# Patient Record
Sex: MALE | Race: BLACK OR AFRICAN AMERICAN | NOT HISPANIC OR LATINO | Employment: OTHER | URBAN - METROPOLITAN AREA
[De-identification: names, ages, dates, MRNs, and addresses within clinical notes are randomized per-mention and may not be internally consistent; named-entity substitution may affect disease eponyms.]

---

## 2020-03-17 PROBLEM — R07.9 CHEST PAIN, MODERATE CORONARY ARTERY RISK: Status: ACTIVE | Noted: 2020-03-17

## 2020-03-17 PROBLEM — I10 PRIMARY HYPERTENSION: Status: ACTIVE | Noted: 2020-03-17

## 2020-03-17 PROBLEM — Z72.0 TOBACCO ABUSE: Status: ACTIVE | Noted: 2020-03-17

## 2020-03-17 PROBLEM — N17.9 AKI (ACUTE KIDNEY INJURY): Status: ACTIVE | Noted: 2020-03-17

## 2020-03-17 PROBLEM — D73.5 SPLENIC INFARCT: Status: ACTIVE | Noted: 2020-03-17

## 2020-03-17 PROBLEM — F19.10 POLYSUBSTANCE ABUSE: Status: ACTIVE | Noted: 2020-03-17

## 2020-03-19 ENCOUNTER — TELEPHONE (OUTPATIENT)
Dept: CARDIOLOGY | Facility: CLINIC | Age: 55
End: 2020-03-19

## 2020-03-19 NOTE — TELEPHONE ENCOUNTER
----- Message from Jeremy Ng sent at 3/19/2020 10:39 AM CDT -----  Contact: Amie/[facility]  Amie called in and wanted to schedule attached patient for a hospital follow up.  Amie stated patient was admitted for chest pains on 3/17 and discharged today 3/19.  Patient was seen by Dr. Billings while in hospital.  System would not allow us to schedule.    Patient call back number is [phone]

## 2024-01-17 PROBLEM — J96.00 ACUTE RESPIRATORY FAILURE DUE TO COVID-19: Status: ACTIVE | Noted: 2024-01-17

## 2024-01-17 PROBLEM — I50.43 ACUTE ON CHRONIC COMBINED SYSTOLIC AND DIASTOLIC HEART FAILURE: Status: ACTIVE | Noted: 2024-01-17

## 2024-01-17 PROBLEM — Z71.89 ACP (ADVANCE CARE PLANNING): Status: ACTIVE | Noted: 2024-01-17

## 2024-01-17 PROBLEM — U07.1 ACUTE RESPIRATORY FAILURE DUE TO COVID-19: Status: ACTIVE | Noted: 2024-01-17

## 2024-01-17 PROBLEM — E87.5 HYPERKALEMIA: Status: ACTIVE | Noted: 2024-01-17

## 2024-01-20 PROBLEM — Z75.8 DISCHARGE PLANNING ISSUES: Status: ACTIVE | Noted: 2024-01-20

## 2024-01-24 PROBLEM — R91.8 PULMONARY NODULES: Status: ACTIVE | Noted: 2024-01-24

## 2024-04-22 PROBLEM — J96.00 ACUTE RESPIRATORY FAILURE DUE TO COVID-19: Status: RESOLVED | Noted: 2024-01-17 | Resolved: 2024-04-22

## 2024-04-22 PROBLEM — U07.1 ACUTE RESPIRATORY FAILURE DUE TO COVID-19: Status: RESOLVED | Noted: 2024-01-17 | Resolved: 2024-04-22

## 2024-10-10 PROBLEM — N18.9 CKD (CHRONIC KIDNEY DISEASE): Status: ACTIVE | Noted: 2024-10-10

## 2024-10-10 PROBLEM — F14.10 COCAINE ABUSE: Status: ACTIVE | Noted: 2024-10-10

## 2024-10-11 PROBLEM — I42.8 NICM (NONISCHEMIC CARDIOMYOPATHY): Status: ACTIVE | Noted: 2024-10-11

## 2024-10-12 PROBLEM — I25.10 CORONARY ARTERY DISEASE INVOLVING NATIVE CORONARY ARTERY OF NATIVE HEART: Status: ACTIVE | Noted: 2024-10-12

## 2024-10-12 PROBLEM — I48.0 PAF (PAROXYSMAL ATRIAL FIBRILLATION): Status: ACTIVE | Noted: 2024-10-12

## 2024-10-14 PROBLEM — I21.4 NSTEMI (NON-ST ELEVATED MYOCARDIAL INFARCTION): Status: ACTIVE | Noted: 2024-10-14

## 2024-10-21 PROBLEM — I24.9 ACUTE CORONARY SYNDROME: Status: ACTIVE | Noted: 2024-10-21

## 2024-10-24 ENCOUNTER — TELEPHONE (OUTPATIENT)
Dept: NEPHROLOGY | Facility: CLINIC | Age: 59
End: 2024-10-24
Payer: MEDICAID

## 2024-10-24 ENCOUNTER — TELEPHONE (OUTPATIENT)
Dept: CARDIOLOGY | Facility: CLINIC | Age: 59
End: 2024-10-24
Payer: MEDICAID

## 2024-10-24 PROBLEM — Z73.6 LIMITATION OF ACTIVITY DUE TO DISABILITY: Status: ACTIVE | Noted: 2024-10-24

## 2024-10-24 NOTE — TELEPHONE ENCOUNTER
Attempted to contact patient to schedule appointment. Unable to reach them at this time. New patient appointment made with Dr. Madden for 01/07/25 at 1100.

## 2024-10-24 NOTE — TELEPHONE ENCOUNTER
----- Message from Carmelita sent at 10/24/2024 12:52 PM CDT -----  Type: Needs Medical Advice  Who Called:  pt  Pharmacy name and phone #:    Best Call Back Number: [phone]  Additional Information: pt is calling the office for a nephrology apt, no templates are showing for the apts, pt was discharged from hospital today , please call pt back to advise thanks